# Patient Record
Sex: FEMALE | Race: WHITE | URBAN - METROPOLITAN AREA
[De-identification: names, ages, dates, MRNs, and addresses within clinical notes are randomized per-mention and may not be internally consistent; named-entity substitution may affect disease eponyms.]

---

## 2021-01-26 ENCOUNTER — APPOINTMENT (RX ONLY)
Dept: URBAN - METROPOLITAN AREA CLINIC 132 | Facility: CLINIC | Age: 61
Setting detail: DERMATOLOGY
End: 2021-01-26

## 2021-01-26 VITALS — TEMPERATURE: 97.7 F

## 2021-01-26 DIAGNOSIS — D22 MELANOCYTIC NEVI: ICD-10-CM | Status: STABLE

## 2021-01-26 DIAGNOSIS — L81.4 OTHER MELANIN HYPERPIGMENTATION: ICD-10-CM | Status: STABLE

## 2021-01-26 DIAGNOSIS — Z41.9 ENCOUNTER FOR PROCEDURE FOR PURPOSES OTHER THAN REMEDYING HEALTH STATE, UNSPECIFIED: ICD-10-CM

## 2021-01-26 DIAGNOSIS — L57.8 OTHER SKIN CHANGES DUE TO CHRONIC EXPOSURE TO NONIONIZING RADIATION: ICD-10-CM

## 2021-01-26 DIAGNOSIS — D18.0 HEMANGIOMA: ICD-10-CM | Status: STABLE

## 2021-01-26 DIAGNOSIS — L82.1 OTHER SEBORRHEIC KERATOSIS: ICD-10-CM | Status: STABLE

## 2021-01-26 DIAGNOSIS — L81.1 CHLOASMA: ICD-10-CM

## 2021-01-26 PROBLEM — D18.01 HEMANGIOMA OF SKIN AND SUBCUTANEOUS TISSUE: Status: ACTIVE | Noted: 2021-01-26

## 2021-01-26 PROBLEM — D22.9 MELANOCYTIC NEVI, UNSPECIFIED: Status: ACTIVE | Noted: 2021-01-26

## 2021-01-26 PROCEDURE — ? PRESCRIPTION MEDICATION MANAGEMENT

## 2021-01-26 PROCEDURE — ? COUNSELING

## 2021-01-26 PROCEDURE — ? MEDICAL CONSULTATION: DYNAMIC RHYTIDES

## 2021-01-26 PROCEDURE — ? SUNSCREEN RECOMMENDATIONS

## 2021-01-26 PROCEDURE — 99204 OFFICE O/P NEW MOD 45 MIN: CPT

## 2021-01-26 NOTE — PROCEDURE: PRESCRIPTION MEDICATION MANAGEMENT
Render In Strict Bullet Format?: No
Detail Level: Zone
Plan: Offered rebrightalyze with 4% hydroquinone, patient declines

## 2021-03-26 ENCOUNTER — APPOINTMENT (RX ONLY)
Dept: URBAN - METROPOLITAN AREA CLINIC 132 | Facility: CLINIC | Age: 61
Setting detail: DERMATOLOGY
End: 2021-03-26

## 2021-03-26 VITALS — TEMPERATURE: 97.3 F

## 2021-03-26 DIAGNOSIS — Z41.9 ENCOUNTER FOR PROCEDURE FOR PURPOSES OTHER THAN REMEDYING HEALTH STATE, UNSPECIFIED: ICD-10-CM

## 2021-03-26 PROCEDURE — ? BOTOX

## 2021-03-26 PROCEDURE — ? COSMETIC CONSULTATION: FILLERS

## 2021-03-26 ASSESSMENT — LOCATION ZONE DERM: LOCATION ZONE: LIP

## 2021-03-26 ASSESSMENT — LOCATION SIMPLE DESCRIPTION DERM: LOCATION SIMPLE: UPPER LIP

## 2021-03-26 ASSESSMENT — LOCATION DETAILED DESCRIPTION DERM: LOCATION DETAILED: PHILTRUM

## 2021-03-26 NOTE — PROCEDURE: BOTOX
Reconstitution Date (Optional): 3/26/21
Additional Area 3 Units: 0
Post-Care Instructions: Patient instructed to not lie down for 4 hours and limit physical activity for 24 hours. Patient instructed not to travel by airplane for 48 hours.
Show Right And Left Brow Units: No
Show Additional Area 5: Yes
Forehead Units: 10
Dilution (U/0.1 Cc): 4
Consent: Written consent obtained. Risks include but not limited to lid/brow ptosis, bruising, swelling, diplopia, temporary effect, incomplete chemical denervation.
Lot #: [id number]
Periorbital Skin Units: [address] [facility], [place], operated by [facility]
Detail Level: Detailed
Expiration Date (Month Year): 08/2023
Glabellar Complex Units: 20

## 2021-04-09 ENCOUNTER — APPOINTMENT (RX ONLY)
Dept: URBAN - METROPOLITAN AREA CLINIC 132 | Facility: CLINIC | Age: 61
Setting detail: DERMATOLOGY
End: 2021-04-09

## 2021-04-09 VITALS — TEMPERATURE: 97.5 F

## 2021-04-09 DIAGNOSIS — Z41.9 ENCOUNTER FOR PROCEDURE FOR PURPOSES OTHER THAN REMEDYING HEALTH STATE, UNSPECIFIED: ICD-10-CM

## 2021-04-09 PROCEDURE — ? FILLERS

## 2021-04-09 PROCEDURE — ? BOTOX

## 2021-04-09 NOTE — PROCEDURE: FILLERS
Additional Area 2 Volume In Cc: 0
Include Cannula Information In Note?: No
Include Cannula Length?: 1.5 inch
Additional Area 1 Volume In Cc: 1
Include Cannula Size?: 25G
Additional Area 1 Location: perioral
Post-Care Instructions: Patient instructed to apply ice to reduce swelling.
Expiration Date (Month Year): 12/20/20
Consent: Written consent obtained. Risks include but not limited to bruising, beading, irregular texture, ulceration, infection, allergic reaction, scar formation, incomplete augmentation, temporary nature, procedural pain.
Expiration Date (Month Year): 12/18/21
Lot #: [id number]
Lot #: [id number]
Map Statment: See [address] for Complete Details
Detail Level: Detailed
Filler: Belotero

## 2021-04-09 NOTE — PROCEDURE: BOTOX
Glabellar Complex Units: 10
Additional Area 5 Units: 0
Show Anterior Platysmal Band Units: Yes
Post-Care Instructions: Patient instructed to not lie down for 4 hours and limit physical activity for 24 hours. Patient instructed not to travel by airplane for 48 hours.
Reconstitution Date (Optional): 4/9/2021
Show Right And Left Periorbital Units: No
Expiration Date (Month Year): 9/23
Detail Level: Detailed
Lot #: [id number]
Consent: Written consent obtained. Risks include but not limited to lid/brow ptosis, bruising, swelling, diplopia, temporary effect, incomplete chemical denervation.
Dilution (U/0.1 Cc): 4

## 2021-09-01 ENCOUNTER — APPOINTMENT (RX ONLY)
Dept: URBAN - METROPOLITAN AREA CLINIC 132 | Facility: CLINIC | Age: 61
Setting detail: DERMATOLOGY
End: 2021-09-01

## 2021-09-01 DIAGNOSIS — Z41.9 ENCOUNTER FOR PROCEDURE FOR PURPOSES OTHER THAN REMEDYING HEALTH STATE, UNSPECIFIED: ICD-10-CM

## 2021-09-01 PROCEDURE — ? BOTOX

## 2021-09-01 PROCEDURE — ? FILLERS

## 2021-09-01 NOTE — PROCEDURE: FILLERS
Additional Area 1 Volume In Cc: 0
Additional Area 1 Volume In Cc: 1
Additional Area 1 Location: perioral lip lines
Filler: Restylane Kysse
Include Cannula Length?: 1.5 inch
Detail Level: Detailed
Consent: Written consent obtained. Risks include but not limited to bruising, beading, irregular texture, ulceration, infection, allergic reaction, scar formation, incomplete augmentation, temporary nature, procedural pain.
Use Map Statement For Sites (Optional): No
Filler: Belotero
Post-Care Instructions: Patient instructed to apply ice to reduce swelling.
Map Statment: See [address] for Complete Details
Lot #: [id number]
Expiration Date (Month Year): 2022-10-31
Lot #: [id number]
Expiration Date (Month Year): 05/20/2022
Topical Anesthesia?: 5% lidocaine
Include Cannula Size?: 25G

## 2021-09-01 NOTE — PROCEDURE: BOTOX
Glabellar Complex Units: [address] [facility] [address]
Additional Area 5 Units: 0
Show Anterior Platysmal Band Units: Yes
Post-Care Instructions: Patient instructed to not lie down for 4 hours and limit physical activity for 24 hours. Patient instructed not to travel by airplane for 48 hours.
Reconstitution Date (Optional): 4/9/2021
Show Right And Left Periorbital Units: No
Periorbital Skin Units: [address] [facility]
Expiration Date (Month Year): 10/2023
Detail Level: Detailed
Forehead Units: 10
Lot #: [id number]
Consent: Written consent obtained. Risks include but not limited to lid/brow ptosis, bruising, swelling, diplopia, temporary effect, incomplete chemical denervation.
Dilution (U/0.1 Cc): 4

## 2021-10-14 ENCOUNTER — APPOINTMENT (RX ONLY)
Dept: URBAN - METROPOLITAN AREA CLINIC 132 | Facility: CLINIC | Age: 61
Setting detail: DERMATOLOGY
End: 2021-10-14

## 2021-10-14 DIAGNOSIS — Z41.9 ENCOUNTER FOR PROCEDURE FOR PURPOSES OTHER THAN REMEDYING HEALTH STATE, UNSPECIFIED: ICD-10-CM

## 2021-10-14 PROCEDURE — ? COSMETIC CONSULTATION: BOTOX

## 2021-10-14 ASSESSMENT — LOCATION DETAILED DESCRIPTION DERM: LOCATION DETAILED: GLABELLA

## 2021-10-14 ASSESSMENT — LOCATION ZONE DERM: LOCATION ZONE: FACE

## 2021-10-14 ASSESSMENT — LOCATION SIMPLE DESCRIPTION DERM: LOCATION SIMPLE: GLABELLA

## 2021-12-17 ENCOUNTER — APPOINTMENT (RX ONLY)
Dept: URBAN - METROPOLITAN AREA CLINIC 132 | Facility: CLINIC | Age: 61
Setting detail: DERMATOLOGY
End: 2021-12-17

## 2021-12-17 DIAGNOSIS — Z41.9 ENCOUNTER FOR PROCEDURE FOR PURPOSES OTHER THAN REMEDYING HEALTH STATE, UNSPECIFIED: ICD-10-CM

## 2021-12-17 PROCEDURE — ? BOTOX

## 2021-12-17 NOTE — PROCEDURE: BOTOX
Nasal Root Units: 0
Forehead Units: [address] [facility]
Dilution (U/0.1 Cc): 4
Show Additional Area 2: Yes
Show Mentalis Units: No
Additional Area 1 Location: [facility]
Glabellar Complex Units: [address]
Consent: Written consent obtained. Risks include but not limited to lid/brow ptosis, bruising, swelling, diplopia, temporary effect, incomplete chemical denervation.
Post-Care Instructions: Patient instructed to not lie down for 4 hours and limit physical activity for 24 hours. Patient instructed not to travel by airplane for 48 hours.
Detail Level: Detailed
Patient Specific Comments (Will Not Stick From Patient To Patient): Glabella mixed 1:1
Periorbital Skin Units: 20
Lot #: [id number]
Expiration Date (Month Year): 02 2024

## 2022-03-03 ENCOUNTER — APPOINTMENT (RX ONLY)
Dept: URBAN - METROPOLITAN AREA CLINIC 132 | Facility: CLINIC | Age: 62
Setting detail: DERMATOLOGY
End: 2022-03-03

## 2022-03-03 DIAGNOSIS — Z41.9 ENCOUNTER FOR PROCEDURE FOR PURPOSES OTHER THAN REMEDYING HEALTH STATE, UNSPECIFIED: ICD-10-CM

## 2022-03-03 PROCEDURE — ? BOTOX

## 2022-03-03 NOTE — PROCEDURE: BOTOX
Nasal Root Units: 0
Forehead Units: 24
Dilution (U/0.1 Cc): 4
Show Additional Area 2: Yes
Show Mentalis Units: No
Additional Area 1 Location: [facility]
Glabellar Complex Units: [address]
Consent: Written consent obtained. Risks include but not limited to lid/brow ptosis, bruising, swelling, diplopia, temporary effect, incomplete chemical denervation.
Post-Care Instructions: Patient instructed to not lie down for 4 hours and limit physical activity for 24 hours. Patient instructed not to travel by airplane for 48 hours.
Detail Level: Detailed
Patient Specific Comments (Will Not Stick From Patient To Patient): Glabella mixed 1:1
Periorbital Skin Units: 21
Lot #: [id number]
Expiration Date (Month Year): 04 2024

## 2022-03-29 ENCOUNTER — APPOINTMENT (RX ONLY)
Dept: URBAN - METROPOLITAN AREA CLINIC 132 | Facility: CLINIC | Age: 62
Setting detail: DERMATOLOGY
End: 2022-03-29

## 2022-03-29 DIAGNOSIS — Z41.9 ENCOUNTER FOR PROCEDURE FOR PURPOSES OTHER THAN REMEDYING HEALTH STATE, UNSPECIFIED: ICD-10-CM

## 2022-03-29 PROCEDURE — ? XEOMIN

## 2022-03-29 ASSESSMENT — LOCATION SIMPLE DESCRIPTION DERM
LOCATION SIMPLE: RIGHT FOREHEAD
LOCATION SIMPLE: GLABELLA
LOCATION SIMPLE: LEFT EYEBROW
LOCATION SIMPLE: LEFT FOREHEAD
LOCATION SIMPLE: RIGHT EYEBROW

## 2022-03-29 ASSESSMENT — LOCATION DETAILED DESCRIPTION DERM
LOCATION DETAILED: RIGHT MEDIAL FOREHEAD
LOCATION DETAILED: LEFT MEDIAL FOREHEAD
LOCATION DETAILED: RIGHT LATERAL FOREHEAD
LOCATION DETAILED: LEFT FOREHEAD
LOCATION DETAILED: RIGHT INFERIOR FOREHEAD
LOCATION DETAILED: LEFT CENTRAL EYEBROW
LOCATION DETAILED: RIGHT INFERIOR LATERAL FOREHEAD
LOCATION DETAILED: GLABELLA
LOCATION DETAILED: RIGHT CENTRAL EYEBROW
LOCATION DETAILED: LEFT LATERAL FOREHEAD
LOCATION DETAILED: LEFT INFERIOR FOREHEAD

## 2022-03-29 ASSESSMENT — LOCATION ZONE DERM: LOCATION ZONE: FACE

## 2022-03-29 NOTE — PROCEDURE: XEOMIN
Show Topical Anesthesia: Yes
Glabellar Complex Units: [address]
Additional Area 1 Units: 0
Show Mentalis Units: No
Expiration Date (Month Year): 12/2023
Lot #: [id number]
Show Inventory Tab: Hide
Forehead Units: [address] [facility]
Consent: Written consent obtained. Risks include but not limited to lid/brow ptosis, bruising, swelling, diplopia, temporary effect, incomplete chemical denervation.
Dilution (U/0.1 Cc): 4
Post-Care Instructions: Patient instructed to not lie down for 4 hours and limit physical activity for 24 hours. Patient instructed not to travel by airplane for 48 hours.
Detail Level: Detailed

## 2022-04-18 ENCOUNTER — APPOINTMENT (RX ONLY)
Dept: URBAN - METROPOLITAN AREA CLINIC 132 | Facility: CLINIC | Age: 62
Setting detail: DERMATOLOGY
End: 2022-04-18

## 2022-04-18 DIAGNOSIS — Z41.9 ENCOUNTER FOR PROCEDURE FOR PURPOSES OTHER THAN REMEDYING HEALTH STATE, UNSPECIFIED: ICD-10-CM

## 2022-04-18 PROCEDURE — ? ADDITIONAL NOTES

## 2022-04-18 NOTE — PROCEDURE: ADDITIONAL NOTES
Detail Level: Detailed
Render Risk Assessment In Note?: no
Additional Notes: Patient is no longer responsive to neurotoxins. Failed Botox and Xeomin. Informed she would be able to have a filler at no cost in the future if she chooses since she has had no improvement with the neurotoxins.

## 2022-04-26 ENCOUNTER — APPOINTMENT (RX ONLY)
Dept: URBAN - METROPOLITAN AREA CLINIC 132 | Facility: CLINIC | Age: 62
Setting detail: DERMATOLOGY
End: 2022-04-26

## 2022-11-15 ENCOUNTER — APPOINTMENT (RX ONLY)
Dept: URBAN - METROPOLITAN AREA CLINIC 132 | Facility: CLINIC | Age: 62
Setting detail: DERMATOLOGY
End: 2022-11-15

## 2022-11-15 DIAGNOSIS — D22 MELANOCYTIC NEVI: ICD-10-CM | Status: STABLE

## 2022-11-15 DIAGNOSIS — L82.1 OTHER SEBORRHEIC KERATOSIS: ICD-10-CM | Status: STABLE

## 2022-11-15 DIAGNOSIS — L81.4 OTHER MELANIN HYPERPIGMENTATION: ICD-10-CM | Status: STABLE

## 2022-11-15 DIAGNOSIS — D18.0 HEMANGIOMA: ICD-10-CM | Status: STABLE

## 2022-11-15 DIAGNOSIS — L57.8 OTHER SKIN CHANGES DUE TO CHRONIC EXPOSURE TO NONIONIZING RADIATION: ICD-10-CM

## 2022-11-15 PROBLEM — D22.9 MELANOCYTIC NEVI, UNSPECIFIED: Status: ACTIVE | Noted: 2022-11-15

## 2022-11-15 PROBLEM — D18.01 HEMANGIOMA OF SKIN AND SUBCUTANEOUS TISSUE: Status: ACTIVE | Noted: 2022-11-15

## 2022-11-15 PROCEDURE — ? COUNSELING

## 2022-11-15 PROCEDURE — ? SUNSCREEN RECOMMENDATIONS

## 2022-11-15 PROCEDURE — ? PRESCRIPTION

## 2022-11-15 PROCEDURE — ? PRESCRIPTION MEDICATION MANAGEMENT

## 2022-11-15 PROCEDURE — 99213 OFFICE O/P EST LOW 20 MIN: CPT

## 2022-11-15 ASSESSMENT — LOCATION DETAILED DESCRIPTION DERM: LOCATION DETAILED: LEFT MEDIAL FOREHEAD

## 2022-11-15 ASSESSMENT — LOCATION SIMPLE DESCRIPTION DERM: LOCATION SIMPLE: LEFT FOREHEAD

## 2022-11-15 ASSESSMENT — LOCATION ZONE DERM: LOCATION ZONE: FACE

## 2024-03-12 ENCOUNTER — APPOINTMENT (RX ONLY)
Dept: URBAN - METROPOLITAN AREA CLINIC 132 | Facility: CLINIC | Age: 64
Setting detail: DERMATOLOGY
End: 2024-03-12

## 2024-03-12 DIAGNOSIS — L82.1 OTHER SEBORRHEIC KERATOSIS: ICD-10-CM | Status: STABLE

## 2024-03-12 DIAGNOSIS — D18.0 HEMANGIOMA: ICD-10-CM | Status: STABLE

## 2024-03-12 DIAGNOSIS — D22 MELANOCYTIC NEVI: ICD-10-CM | Status: STABLE

## 2024-03-12 DIAGNOSIS — L81.4 OTHER MELANIN HYPERPIGMENTATION: ICD-10-CM | Status: STABLE

## 2024-03-12 DIAGNOSIS — L57.8 OTHER SKIN CHANGES DUE TO CHRONIC EXPOSURE TO NONIONIZING RADIATION: ICD-10-CM

## 2024-03-12 PROBLEM — D22.9 MELANOCYTIC NEVI, UNSPECIFIED: Status: ACTIVE | Noted: 2024-03-12

## 2024-03-12 PROBLEM — D18.01 HEMANGIOMA OF SKIN AND SUBCUTANEOUS TISSUE: Status: ACTIVE | Noted: 2024-03-12

## 2024-03-12 PROCEDURE — ? COUNSELING

## 2024-03-12 PROCEDURE — ? SUNSCREEN RECOMMENDATIONS

## 2024-03-12 PROCEDURE — 99213 OFFICE O/P EST LOW 20 MIN: CPT

## 2025-07-31 ENCOUNTER — NEW PATIENT (OUTPATIENT)
Age: 65
End: 2025-07-31

## 2025-07-31 DIAGNOSIS — H25.813: ICD-10-CM

## 2025-07-31 PROCEDURE — 92004 COMPRE OPH EXAM NEW PT 1/>: CPT

## 2025-08-05 ENCOUNTER — EMERGENCY VISIT (OUTPATIENT)
Age: 65
End: 2025-08-05

## 2025-08-05 DIAGNOSIS — S05.01XA: ICD-10-CM

## 2025-08-05 PROCEDURE — 92071 CONTACT LENS FITTING FOR TX: CPT

## 2025-08-05 PROCEDURE — 99213 OFFICE O/P EST LOW 20 MIN: CPT

## 2025-08-05 RX ORDER — MOXIFLOXACIN OPHTHALMIC 5 MG/ML: 1 SOLUTION/ DROPS OPHTHALMIC

## 2025-08-06 ENCOUNTER — FOLLOW UP (OUTPATIENT)
Age: 65
End: 2025-08-06

## 2025-08-06 DIAGNOSIS — S05.01XA: ICD-10-CM

## 2025-08-06 PROCEDURE — 99213 OFFICE O/P EST LOW 20 MIN: CPT
